# Patient Record
Sex: MALE | Race: WHITE | NOT HISPANIC OR LATINO | Employment: OTHER | URBAN - METROPOLITAN AREA
[De-identification: names, ages, dates, MRNs, and addresses within clinical notes are randomized per-mention and may not be internally consistent; named-entity substitution may affect disease eponyms.]

---

## 2022-07-05 ENCOUNTER — OFFICE VISIT (OUTPATIENT)
Dept: ORTHOPEDIC SURGERY | Facility: CLINIC | Age: 72
End: 2022-07-05

## 2022-07-05 VITALS
DIASTOLIC BLOOD PRESSURE: 64 MMHG | BODY MASS INDEX: 30.2 KG/M2 | SYSTOLIC BLOOD PRESSURE: 120 MMHG | HEIGHT: 72 IN | WEIGHT: 223 LBS

## 2022-07-05 DIAGNOSIS — M17.12 PRIMARY OSTEOARTHRITIS OF LEFT KNEE: Primary | ICD-10-CM

## 2022-07-05 PROCEDURE — 99204 OFFICE O/P NEW MOD 45 MIN: CPT | Performed by: ORTHOPAEDIC SURGERY

## 2022-07-05 PROCEDURE — 20610 DRAIN/INJ JOINT/BURSA W/O US: CPT | Performed by: ORTHOPAEDIC SURGERY

## 2022-07-05 RX ORDER — LIDOCAINE HYDROCHLORIDE 10 MG/ML
3 INJECTION, SOLUTION EPIDURAL; INFILTRATION; INTRACAUDAL; PERINEURAL
Status: COMPLETED | OUTPATIENT
Start: 2022-07-05 | End: 2022-07-05

## 2022-07-05 RX ORDER — TRIAMCINOLONE ACETONIDE 40 MG/ML
80 INJECTION, SUSPENSION INTRA-ARTICULAR; INTRAMUSCULAR
Status: COMPLETED | OUTPATIENT
Start: 2022-07-05 | End: 2022-07-05

## 2022-07-05 RX ORDER — BUPIVACAINE HYDROCHLORIDE 2.5 MG/ML
3 INJECTION, SOLUTION EPIDURAL; INFILTRATION; INTRACAUDAL
Status: COMPLETED | OUTPATIENT
Start: 2022-07-05 | End: 2022-07-05

## 2022-07-05 RX ORDER — MELOXICAM 15 MG/1
TABLET ORAL
Qty: 90 TABLET | Refills: 1 | Status: SHIPPED | OUTPATIENT
Start: 2022-07-05

## 2022-07-05 RX ADMIN — LIDOCAINE HYDROCHLORIDE 3 ML: 10 INJECTION, SOLUTION EPIDURAL; INFILTRATION; INTRACAUDAL; PERINEURAL at 14:51

## 2022-07-05 RX ADMIN — BUPIVACAINE HYDROCHLORIDE 3 ML: 2.5 INJECTION, SOLUTION EPIDURAL; INFILTRATION; INTRACAUDAL at 14:51

## 2022-07-05 RX ADMIN — TRIAMCINOLONE ACETONIDE 80 MG: 40 INJECTION, SUSPENSION INTRA-ARTICULAR; INTRAMUSCULAR at 14:51

## 2022-07-05 NOTE — PROGRESS NOTES
Orthopaedic Clinic Note: Knee New Patient    Chief Complaint   Patient presents with   • Left Knee - Pain        HPI  Consult from: ESTEE Santiago    Prakash Dubon is a 71 y.o. male who presents with left knee pain for 4 month(s). Onset atraumatic and gradual in nature. Pain is localized to the medial joint line, entire knee (globally) and is a 8/10 on the pain scale. Pain is described as throbbing. Associated symptoms include pain, swelling and stiffness. The pain is worse with walking, climbing stairs, sleeping and rising from seated position; ice and pain medication and/or NSAID make it better. Previous treatments have included: physical therapy since symptom onset. Although some transient relief was reported with these interventions, these conservative measures have failed and symptoms have persisted. The patient is limited in daily activities and has had a significant decrease in quality of life as a result. He denies fevers, chills, or constitutional symptoms.    I have reviewed the following portions of the patient's history:History of Present Illness    History reviewed. No pertinent past medical history.   Past Surgical History:   Procedure Laterality Date   • FOOT SURGERY Right       Family History   Problem Relation Age of Onset   • Cancer Mother    • Cancer Father      Social History     Socioeconomic History   • Marital status:    Tobacco Use   • Smoking status: Never Smoker   • Smokeless tobacco: Never Used   Vaping Use   • Vaping Use: Never used   Substance and Sexual Activity   • Alcohol use: Yes   • Drug use: Never   • Sexual activity: Defer      No current outpatient medications on file prior to visit.     No current facility-administered medications on file prior to visit.      No Known Allergies     Review of Systems   Constitutional: Positive for activity change.   HENT: Negative.    Eyes: Negative.    Respiratory: Negative.    Cardiovascular: Negative.    Gastrointestinal:  "Negative.    Endocrine: Negative.    Genitourinary: Negative.    Musculoskeletal: Positive for arthralgias.   Skin: Negative.    Allergic/Immunologic: Negative.    Neurological: Negative.    Hematological: Negative.    Psychiatric/Behavioral: Positive for sleep disturbance.        The patient's Review of Systems was personally reviewed and confirmed as accurate.    The following portions of the patient's history were reviewed and updated as appropriate: allergies, current medications, past family history, past medical history, past social history, past surgical history and problem list.    Physical Exam  Blood pressure 120/64, height 182.9 cm (72\"), weight 101 kg (223 lb).    Body mass index is 30.24 kg/m².    GENERAL APPEARANCE: awake, alert & oriented x 3, in no acute distress and well developed, well nourished  PSYCH: normal affect  LUNGS:  breathing nonlabored  EYES: sclera anicteric  CARDIOVASCULAR: palpable dorsalis pedis, palpable posterior tibial bilaterally. Capillary refill less than 2 seconds  EXTREMITIES: no clubbing, cyanosis  GAIT:  Antalgic            Right Lower Extremity Exam:   ----------  Hip Exam  ----------  FLEXION CONTRACTURE: None  FLEXION: 110 degrees  INTERNAL ROTATION: 20 degrees at 90 degrees of flexion   EXTERNAL ROTATION: 40 degrees at 90 degrees of flexion    PAIN WITH HIP MOTION: no  ----------  Knee Exam  ----------  ALIGNMENT: neutral, no varus or valgus deformity     RANGE OF MOTION:  Normal (0-120 degrees) with no extensor lag or flexion contracture  LIGAMENTOUS STABILITY:   stable to varus and valgus stress at terminal extension and 30 degrees without any evidence of laxity     STRENGTH:  5/5 knee flexion, extension. 5/5 ankle dorsiflexion and plantarflexion.     PAIN WITH PALPATION: denies tenderness to palpation about the knee, denies medial or lateral joint line pain  KNEE EFFUSION: no  PAIN WITH KNEE ROM: no  PATELLAR CREPITUS: yes, asymptomatic  SPECIAL EXAM FINDINGS:  " Negative patellar compression    REFLEXES:  PATELLAR 2+/4  ACHILLES 2+/4    CLONUS: negative  STRAIGHT LEG TEST:   negative    SENSATION TO LIGHT TOUCH:  DEEP PERONEAL/SUPERFICIAL PERONEAL/SURAL/SAPHENOUS/TIBIAL:   intact    EDEMA:  no  ERYTHEMA:  no  WOUNDS/INCISIONS: no overlying skin problems.      Left Lower Extremity Exam:   ----------  Hip Exam  ----------  FLEXION CONTRACTURE: None  FLEXION: 110 degrees  INTERNAL ROTATION: 20 degrees at 90 degrees of flexion   EXTERNAL ROTATION: 40 degrees at 90 degrees of flexion    PAIN WITH HIP MOTION: no  ----------  Knee Exam  ----------  ALIGNMENT: moderate varus, correctible to neutral    RANGE OF MOTION:  Decreased (5 - 100 degrees) with no extensor lag  LIGAMENTOUS STABILITY:   stable to varus and valgus stress at terminal extension and 30 degrees; retensioning of the MCL is appreciated with valgus stress at 30 degrees consistent with medial compartment degeneration     STRENGTH:  4/5 knee flexion, extension. 5/5 ankle dorsiflexion and plantarflexion.     PAIN WITH PALPATION: global  KNEE EFFUSION: yes, mild effusion  PAIN WITH KNEE ROM: yes, global  PATELLAR CREPITUS: no  SPECIAL EXAM FINDINGS:  none    REFLEXES:  PATELLAR 2+/4  ACHILLES 2+/4    CLONUS: no  STRAIGHT LEG TEST:   negative    SENSATION TO LIGHT TOUCH:  DEEP PERONEAL/SUPERFICIAL PERONEAL/SURAL/SAPHENOUS/TIBIAL:   intact    EDEMA:  no  ERYTHEMA:  no  WOUNDS/INCISIONS:  no      ______________________________________________________________________  ______________________________________________________________________    RADIOGRAPHIC FINDINGS:   Left knee and hip radiographs from 2/17/2022 are personally interpreted.  Radiographs demonstrate moderate tricompartmental arthritis of the left knee with genu varum alignment.  Small peritubular osteophytes visualized in all compartments.  Left hip demonstrates mild to moderate arthritic changes with no acute bony injury or fracture.    Assessment/Plan:    Diagnosis Plan   1. Primary osteoarthritis of left knee       Patient suffering from arthritic flareup of the left knee with large joint effusion.  I discussed treatment options.  He is agreeable to prescription anti-inflammatory as well as knee aspiration and cortisone injection today.  He will follow-up in 6 weeks.    Procedure Note:  I discussed with the patient the potential benefits of performing a therapeutic aspiration and injection of the left knee as well as potential risks including but not limited to infection, swelling, pain, bleeding, bruising, nerve/vessel damage, skin color changes, transient elevation in blood glucose levels, and fat atrophy. After informed consent and verifying correct patient, procedure site, and type of procedure, the area was prepped with alcohol, ethyl chloride was used to numb the skin. Via the superior lateral approach, an 18-gauge needle was inserted into the knee joint and 64 cc of clear yellow joint fluid were aspirated from the knee.  Then, 3cc of 1% lidocaine, 1 cc of 0.75% Marcaine and 2 cc of 40mg/ml of Kenalog were injected into the left knee. The patient tolerated the procedure well. There were no complications. A sterile dressing was placed over the injection site.      Haile Jacobsen MD  07/05/22  14:55 EDT

## 2022-07-05 NOTE — PROGRESS NOTES
Procedure   Large Joint Arthrocentesis: L knee Injection and Aspiration  Date/Time: 7/5/2022 2:51 PM  Consent given by: patient  Site marked: site marked  Timeout: Immediately prior to procedure a time out was called to verify the correct patient, procedure, equipment, support staff and site/side marked as required   Supporting Documentation  Indications: pain   Procedure Details  Location: knee - L knee  Preparation: Patient was prepped and draped in the usual sterile fashion  Needle size: 18 G  Approach: anterolateral  Medications administered: 3 mL bupivacaine (PF) 0.25 %; 3 mL lidocaine PF 1% 1 %; 80 mg triamcinolone acetonide 40 MG/ML  Aspirate amount: 64 mL  Aspirate: clear and yellow  Patient tolerance: patient tolerated the procedure well with no immediate complications

## 2022-07-20 ENCOUNTER — TELEPHONE (OUTPATIENT)
Dept: ORTHOPEDIC SURGERY | Facility: CLINIC | Age: 72
End: 2022-07-20

## 2022-07-20 NOTE — TELEPHONE ENCOUNTER
Caller: PATIENT    Relationship to patient: SELF     Best call back number:  [phone]    Patient is needing: PATIENT WAS CALLING TO TALK TO THE CLINICAL STAFF ABOUT THE PAIN HE IS HAVING IN HIS LEFT KNEE. PATIENT STATED HE IS IN A LOT OF PAIN AND HE IS NOT SURE HE CAN WAIT TIL HIS NEXT APPT ON 08.16.22 TO DO ANYTHING. PATIENT STATED THE INJECTION HE JUST RECEIVED BY [facility] ONLY LASTED 2 DAYS. PATIENT STATED HE  CAN BARELY WALK AND THE PAIN IS STARTING TO RADIATE TO HIS LEFT HIP NOW. PATIENT WOULD LIKE A CALL BACK FROM THE CLINICAL STAFF TO DISCUSS HOW TO HANDLE THIS. THANK YOU!

## 2022-07-21 ENCOUNTER — OFFICE VISIT (OUTPATIENT)
Dept: ORTHOPEDIC SURGERY | Facility: CLINIC | Age: 72
End: 2022-07-21

## 2022-07-21 VITALS
HEIGHT: 72 IN | DIASTOLIC BLOOD PRESSURE: 76 MMHG | BODY MASS INDEX: 30.2 KG/M2 | WEIGHT: 223 LBS | SYSTOLIC BLOOD PRESSURE: 122 MMHG

## 2022-07-21 DIAGNOSIS — M17.12 PRIMARY OSTEOARTHRITIS OF LEFT KNEE: Primary | ICD-10-CM

## 2022-07-21 PROCEDURE — 99214 OFFICE O/P EST MOD 30 MIN: CPT | Performed by: PHYSICIAN ASSISTANT

## 2022-07-21 RX ORDER — SILODOSIN 8 MG/1
CAPSULE ORAL
COMMUNITY

## 2022-07-21 RX ORDER — FINASTERIDE 5 MG/1
5 TABLET, FILM COATED ORAL DAILY
COMMUNITY

## 2022-07-21 NOTE — PROGRESS NOTES
"    [facility] [place] [facility] [place]        Subjective     CC: Follow-up (Primary osteoarthritis of left knee, last cortisone injection/aspiration 7/5/22)      HPI    Prakash Dubon is a 71 y.o. male.  Patient (new to me) returns for follow-up evaluation of his left knee.  On 7/5/2022 seen by Dr. Jacobsen and underwent joint aspiration (64 cc aspirated) followed by corticosteroid injection.  He states that he had good range of motion with aspiration but only noted 2 days of some pain relief.  Now the pain is worsening again.  He notes pain to the anterior medial aspect of the knee.  He has been wearing a cloth sleeve.  States he is unable to walk downhill secondary to the pain.    Current pain scale 8/10.  Associated symptoms swelling and stiffness to the knee.  Pain is worse with walking, stair climbing, lying on the affected side, rising from a seated position.  He takes Tylenol arthritis with some relief of pain.  Meloxicam provides no relief.  No reported numbness or tingling into the lower extremity.    Overall, patient's symptoms are worsening.    ROS:    Constiutional:Pt denies fever, chills, nausea, or vomiting.  MSK:as above        Objective      Past Medical History  History reviewed. No pertinent past medical history.      Physical Exam  /76   Ht 182.9 cm (72.01\")   Wt 101 kg (223 lb)   BMI 30.24 kg/m²     Body mass index is 30.24 kg/m².    Patient is well nourished and well developed.        Ortho Exam  Integument:   Left knee: No skin lesions, no rash, no ecchymosis    Neurologic:   Motor:    5/5 quadriceps, hamstrings, ankle dorsiflexors, and ankle plantar flexors    Lower Extremities:   Left knee:    Tenderness:  Positive diffuse/global tenderness with increased pain noted along anterior medial joint line    Effusion:  Positive    Swelling:  None    Crepitus:  Positive    Atrophy:  None    Range of motion:  Extension: 5°       Flexion: 120°    Instability:  No varus laxity, no valgus " laxity, negative anterior drawer    Deformities:  Genu varum      Imaging/Labs/EMG Reviewed:  Ordered left knee plain films.  Imaging read/interpreted by Dr. Jacobsen.    Imaging Results (Last 24 Hours)     Procedure Component Value Units Date/Time    XR Knee 4+ View Left [[id number]] Resulted: 07/21/22 1151     Updated: 07/21/22 1153    Narrative:      Indication: Left knee pain    Comparison: Todays xrays were compared to previous xrays from 2/17/2022      Impression:           Left Knee: moderate to severe tricompartmental arthritis with genu varum   alignment and bone-on-bone articulation medial compartment, periarticular   osteophytes visualized in all compartments and No significant changes   compared to prior radiographs.            Assessment:  1. Primary osteoarthritis of left knee        Plan:  1. Left knee osteoarthritis--due to worsening pain discussed further treatment options to include viscosupplementation series, bracing, surgery.  2. Patient is uninterested in surgical intervention (TKA) at this time.  3. He was provided a medial  brace.  Upon leaving the clinic he noted improvement of pain symptoms.  4. Placed preauthorization for viscosupplementation series.  5. Continue with the meloxicam prescribed by Dr. Jacobsen.  DC if any GI side effects develop.  6. Patient already has a follow-up scheduled with Dr. Jacobsen on 8/16/2022--to keep this appointment and if authorized to begin viscosupplementation series.  7. Questions and concerns answered.    History, exam and imaging discussed with Dr. Jacobsen, who agrees with the above assessment and plan.      Netta Reyna PA-C  07/21/22  21:33 EDT      Dictated Utilizing Dragon Dictation.

## 2022-07-27 ENCOUNTER — TELEPHONE (OUTPATIENT)
Dept: ORTHOPEDIC SURGERY | Facility: CLINIC | Age: 72
End: 2022-07-27

## 2022-07-27 NOTE — TELEPHONE ENCOUNTER
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  STATES HE IS IN A LOT OF PAIN AND DOES NOT WANT TO WAIT FOR APPROVAL. PLEASE CALL TO ADVISE IF SELF PAY FOR THIS PROCEDURE IS AN OPTION?

## 2022-08-05 ENCOUNTER — TELEPHONE (OUTPATIENT)
Dept: ORTHOPEDIC SURGERY | Facility: CLINIC | Age: 72
End: 2022-08-05

## 2022-08-05 NOTE — TELEPHONE ENCOUNTER
Hub staff attempted to follow warm transfer process and was unsuccessful     Caller:   LALO  Best call back number: [phone]    Patient is needing: PT IS CALLING TO CHECK ON THE AUTH FOR HIS GEL INJECTIONS, HE REPORTS HE GOT A CALL FROM ELYSIA(?) ABOUT IT BUT NEEDED SOME MORE INFORMATION.  PT HAS AN APPT ON THE 16

## 2022-08-16 ENCOUNTER — CLINICAL SUPPORT (OUTPATIENT)
Dept: ORTHOPEDIC SURGERY | Facility: CLINIC | Age: 72
End: 2022-08-16

## 2022-08-16 DIAGNOSIS — M17.12 PRIMARY OSTEOARTHRITIS OF LEFT KNEE: ICD-10-CM

## 2022-08-16 PROCEDURE — 20610 DRAIN/INJ JOINT/BURSA W/O US: CPT | Performed by: ORTHOPAEDIC SURGERY

## 2022-08-16 RX ORDER — TRAMADOL HYDROCHLORIDE 50 MG/1
TABLET ORAL
COMMUNITY
Start: 2022-07-28

## 2022-08-16 NOTE — PROGRESS NOTES
Procedure   Large Joint Arthrocentesis: L knee  Date/Time: 8/16/2022 7:34 AM  Consent given by: patient  Site marked: site marked  Timeout: Immediately prior to procedure a time out was called to verify the correct patient, procedure, equipment, support staff and site/side marked as required   Supporting Documentation  Indications: pain   Procedure Details  Location: knee - L knee  Preparation: Patient was prepped and draped in the usual sterile fashion  Needle size: 22 G  Approach: superior  Medications administered: 30 mg Hyaluronan 30 MG/2ML  Aspirate: clear (to verify intraarticular placement of the needle)  Patient tolerance: patient tolerated the procedure well with no immediate complications

## 2022-08-16 NOTE — PROGRESS NOTES
Patient returns for initiation left knee Orthovisc injections.  States prior cortisone injection provided transient relief for few weeks.  Pain has returned.    Procedure Note:  I discussed with the patient the potential benefits of performing a therapeutic injection of the left knee as well as potential risks including but not limited to infection, swelling, pain, bleeding, bruising, nerve/vessel damage, pseudoseptic reaction, and worsening joint pain. After informed consent and verifying correct patient, procedure site, and type of procedure, the area was prepped with alcohol, ethyl chloride was used to numb the skin. Via the superolateral approach, the viscosupplementation syringe contents were injected into the left knee. The patient tolerated the procedure well. There were no complications. A sterile dressing was placed over the injection site.    Follow up 1 week.

## 2022-08-23 ENCOUNTER — CLINICAL SUPPORT (OUTPATIENT)
Dept: ORTHOPEDIC SURGERY | Facility: CLINIC | Age: 72
End: 2022-08-23

## 2022-08-23 DIAGNOSIS — M17.12 PRIMARY OSTEOARTHRITIS OF LEFT KNEE: Primary | ICD-10-CM

## 2022-08-23 PROCEDURE — 20610 DRAIN/INJ JOINT/BURSA W/O US: CPT | Performed by: ORTHOPAEDIC SURGERY

## 2022-08-23 NOTE — PROGRESS NOTES
Patient returns for second Orthovisc injection the left knee.  Denies complications with the prior injection.  Overall his pain has improved.    Procedure Note:  I discussed with the patient the potential benefits of performing a therapeutic injection of the left knee as well as potential risks including but not limited to infection, swelling, pain, bleeding, bruising, nerve/vessel damage, pseudoseptic reaction, and worsening joint pain. After informed consent and verifying correct patient, procedure site, and type of procedure, the area was prepped with alcohol, ethyl chloride was used to numb the skin. Via the superolateral approach, the viscosupplementation syringe contents were injected into the left knee. The patient tolerated the procedure well. There were no complications. A sterile dressing was placed over the injection site.    Follow up 1 week.

## 2022-08-23 NOTE — PROGRESS NOTES
Procedure   Large Joint Arthrocentesis: L knee  Date/Time: 8/23/2022 7:44 AM  Consent given by: patient  Site marked: site marked  Timeout: Immediately prior to procedure a time out was called to verify the correct patient, procedure, equipment, support staff and site/side marked as required   Supporting Documentation  Indications: pain   Procedure Details  Location: knee - L knee  Preparation: Patient was prepped and draped in the usual sterile fashion  Needle size: 22 G  Approach: anterolateral  Medications administered: 30 mg Hyaluronan 30 MG/2ML  Patient tolerance: patient tolerated the procedure well with no immediate complications

## 2022-08-30 ENCOUNTER — CLINICAL SUPPORT (OUTPATIENT)
Dept: ORTHOPEDIC SURGERY | Facility: CLINIC | Age: 72
End: 2022-08-30

## 2022-08-30 DIAGNOSIS — M17.12 PRIMARY OSTEOARTHRITIS OF LEFT KNEE: Primary | ICD-10-CM

## 2022-08-30 PROCEDURE — 20610 DRAIN/INJ JOINT/BURSA W/O US: CPT | Performed by: ORTHOPAEDIC SURGERY

## 2022-08-30 NOTE — PROGRESS NOTES
Procedure   Large Joint Arthrocentesis: L knee  Date/Time: 8/30/2022 7:48 AM  Consent given by: patient  Site marked: site marked  Timeout: Immediately prior to procedure a time out was called to verify the correct patient, procedure, equipment, support staff and site/side marked as required   Supporting Documentation  Indications: pain   Procedure Details  Location: knee - L knee  Preparation: Patient was prepped and draped in the usual sterile fashion  Needle size: 22 G  Approach: anterolateral  Medications administered: 30 mg Hyaluronan 30 MG/2ML  Patient tolerance: patient tolerated the procedure well with no immediate complications

## 2022-08-30 NOTE — PROGRESS NOTES
Patient returns for third Orthovisc injection the left knee.  Denies complications with the prior injections.  Overall his pain has improved.    Procedure Note:  I discussed with the patient the potential benefits of performing a therapeutic injection of the left knee as well as potential risks including but not limited to infection, swelling, pain, bleeding, bruising, nerve/vessel damage, pseudoseptic reaction, and worsening joint pain. After informed consent and verifying correct patient, procedure site, and type of procedure, the area was prepped with alcohol, ethyl chloride was used to numb the skin. Via the superolateral approach, the viscosupplementation syringe contents were injected into the left knee. The patient tolerated the procedure well. There were no complications. A sterile dressing was placed over the injection site.    Follow up as needed.

## 2023-01-19 DIAGNOSIS — N20.0 KIDNEY STONE: Primary | ICD-10-CM

## 2024-11-21 ENCOUNTER — TRANSCRIBE ORDERS (OUTPATIENT)
Dept: GENERAL RADIOLOGY | Facility: CLINIC | Age: 74
End: 2024-11-21
Payer: MEDICARE

## 2024-11-21 DIAGNOSIS — M25.552 HIP PAIN, BILATERAL: Primary | ICD-10-CM

## 2024-11-21 DIAGNOSIS — M25.551 HIP PAIN, BILATERAL: Primary | ICD-10-CM

## 2024-12-06 ENCOUNTER — OFFICE VISIT (OUTPATIENT)
Dept: NEUROSURGERY | Facility: CLINIC | Age: 74
End: 2024-12-06
Payer: MEDICARE

## 2024-12-06 VITALS — WEIGHT: 227 LBS | BODY MASS INDEX: 30.75 KG/M2 | HEIGHT: 72 IN | TEMPERATURE: 98 F

## 2024-12-06 DIAGNOSIS — M48.062 SPINAL STENOSIS, LUMBAR REGION, WITH NEUROGENIC CLAUDICATION: Primary | ICD-10-CM

## 2024-12-06 DIAGNOSIS — R29.898 RIGHT LEG WEAKNESS: ICD-10-CM

## 2024-12-06 DIAGNOSIS — M25.551 RIGHT HIP PAIN: ICD-10-CM

## 2024-12-06 DIAGNOSIS — M79.604 RIGHT LEG PAIN: ICD-10-CM

## 2024-12-06 DIAGNOSIS — M71.30 SYNOVIAL CYST: ICD-10-CM

## 2024-12-06 RX ORDER — ACETAMINOPHEN 500 MG
500 TABLET ORAL EVERY 6 HOURS PRN
COMMUNITY

## 2024-12-06 NOTE — PROGRESS NOTES
NAME: GIL GRAVES   DOS: 2024  : 1950  PCP: Aj Garner MD    Chief Complaint:    Chief Complaint   Patient presents with    Leg Pain     Right Leg Pain Knee / Shin         History of Present Illness:  73 y.o. male   I saw is very pleasant 73-year-old male neurosurgical consultation he presents with a myriad of symptomatologies and almost a 3-year protracted course of difficulty with lower extremity dysfunction.  He had a right sided ankle surgery out of surgery there for pain and then that this was followed by a left-sided knee surgery with a protracted recovery followed by most currently right-sided inguinal hip pain he reports difficulty walking he is an avid jessica and outdoorsman, he works for [employer] and is retired from [employer] he enjoys hunting    He reports that after a couple of miles on his feet he needs to sit down he is always searching for a chair when he is out for protracted periods of time but he denies any flagrant bowel or bladder incontinence issues    He has done physical therapy has had an epidural steroid block did not help he denies any upper extremity symptoms and is here for evaluation as a second opinion        PMHX  Allergies:  No Known Allergies  Medications    Current Outpatient Medications:     acetaminophen (TYLENOL) 500 MG tablet, Take 1 tablet by mouth Every 6 (Six) Hours As Needed for Mild Pain., Disp: , Rfl:     finasteride (PROSCAR) 5 MG tablet, Take 1 tablet by mouth Daily., Disp: , Rfl:     silodosin (RAPAFLO) 8 MG capsule capsule, silodosin 8 mg capsule  TAKE 1 CAPSULE BY MOUTH EVERY DAY, Disp: , Rfl:   Past Medical History:  Past Medical History:   Diagnosis Date    Arthritis     Cancer basal cell skin    Claustrophobia 2019    Low back pain     Lumbosacral disc disease     Peripheral neuropathy      Past Surgical History:  Past Surgical History:   Procedure Laterality Date    EPIDURAL BLOCK      FOOT SURGERY Right       Social Hx:  Social History     Tobacco Use    Smoking status: Never    Smokeless tobacco: Never   Vaping Use    Vaping status: Never Used   Substance Use Topics    Alcohol use: Yes     Alcohol/week: 1.0 standard drink of alcohol     Types: 1 Shots of liquor per week    Drug use: Never     Family Hx:  Family History   Problem Relation Age of Onset    Cancer Mother         breast    Arthritis Mother     Cancer Father         esophagus     Review of Systems:        Review of Systems   Constitutional:  Negative for activity change, appetite change, chills, diaphoresis, fatigue, fever and unexpected weight change.   HENT:  Negative for congestion, dental problem, drooling, ear discharge, ear pain, facial swelling, hearing loss, mouth sores, nosebleeds, postnasal drip, rhinorrhea, sinus pressure, sinus pain, sneezing, sore throat, tinnitus, trouble swallowing and voice change.    Eyes:  Negative for photophobia, pain, discharge, redness, itching and visual disturbance.   Respiratory:  Negative for apnea, cough, choking, chest tightness, shortness of breath, wheezing and stridor.    Cardiovascular:  Negative for chest pain, palpitations and leg swelling.   Gastrointestinal:  Negative for abdominal distention, abdominal pain, anal bleeding, blood in stool, constipation, diarrhea, nausea, rectal pain and vomiting.   Endocrine: Negative for cold intolerance, heat intolerance, polydipsia, polyphagia and polyuria.   Genitourinary:  Negative for decreased urine volume, difficulty urinating, dysuria, enuresis, flank pain, frequency, genital sores, hematuria, penile discharge, penile pain, penile swelling, scrotal swelling, testicular pain and urgency.   Musculoskeletal:  Negative for arthralgias, back pain, gait problem, joint swelling, myalgias, neck pain and neck stiffness.   Skin:  Negative for color change, pallor, rash and wound.   Allergic/Immunologic: Negative for environmental allergies, food allergies and  immunocompromised state.   Neurological:  Positive for numbness. Negative for dizziness, tremors, seizures, syncope, facial asymmetry, speech difficulty, weakness, light-headedness and headaches.   Hematological:  Negative for adenopathy. Does not bruise/bleed easily.   Psychiatric/Behavioral:  Negative for agitation, behavioral problems, confusion, decreased concentration, dysphoric mood, hallucinations, self-injury, sleep disturbance and suicidal ideas. The patient is not nervous/anxious and is not hyperactive.     I have reviewed this note template and all pertinent parts of the review of systems social, family history, surgical history and medication list      Physical Examination:  Vitals:    12/06/24 0949   Temp: 98 °F (36.7 °C)      General Appearance:   Well developed, well nourished, well groomed, alert, and cooperative.  Neurological examination:  Neurological Exam   Vital signs were reviewed and documented in the chart  Patient appeared in good neurologic function with normal comprehension fluent speech  Mood and affect are normal  Sense of smell deferred    Cranial nerves are intact  Muscle bulk and tone normal  5 out of 5 strength no motor drift, he has atrophy of his right quadricep compared to his left he has difficulty standing on his right leg indicative of proximal muscle weakness it is subtle  Gait normal intact  Negative Romberg  No clonus long tract signs or myelopathy  Decreased vibratory sensation bilaterally  Reflexes symmetric  No edema noted and extremities skin appears normal    Straight leg raise sign absent  No signs of intrinsic hip dysfunction on the left positive on the right  Back is without any lesions or abnormality  Feet are warm and well perfused        Review of Imaging/DATA:  I personally reviewed and interpreted MRI of his lumbar spine he has a left-sided L2-3 arthritis in his facet but with more impressive is the loss of nerve root delineation around the 3 4 and 4 5 I suspect  this is indicative of high-grade central stenosis especially when upright    Less likely this would represent clamping from arachnoiditis or perhaps an intrinsic lesion I told him that was less likely    X-rays show bilateral hip arthritis      Diagnoses/Plan:    Mr. Dubno is a 73 y.o. male   1.  Lumbar spinal stenosis    2.  Neurogenic claudication mostly painless with weakness of his right quadricep atrophy of that level that is quite remarkable    3.  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  I talked him about all the ins and outs recovery from surgery etc. I told him he would likely be a candidate for 2 or 3 level laminectomy he would do well with that probably stay night in the hospital and go home    I did explain all the attendant risk he is certainly skeptical about surgery    After extensive discussion shared decision making plan will be    1.  Continuation physical therapy    2.  I would like to see him back with an MRI of the lumbar spine with and without contrast to further delineate the lumbar nerve root anatomy I suspect this is a benign finding but had like to make sure    3.  He needs to bring his wife and caregiver if we are interested in surgery    4.  He needs a lumbar flexion-extension film to check for instability    I will see him back after this to discuss the surgical care plan    I spent an hour with the patient